# Patient Record
Sex: MALE | Race: WHITE | URBAN - METROPOLITAN AREA
[De-identification: names, ages, dates, MRNs, and addresses within clinical notes are randomized per-mention and may not be internally consistent; named-entity substitution may affect disease eponyms.]

---
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
## 2022-04-10 ENCOUNTER — HISTORICAL (OUTPATIENT)
Dept: ADMINISTRATIVE | Facility: HOSPITAL | Age: 22
End: 2022-04-10

## 2022-04-28 VITALS
SYSTOLIC BLOOD PRESSURE: 108 MMHG | WEIGHT: 150.13 LBS | DIASTOLIC BLOOD PRESSURE: 72 MMHG | HEIGHT: 70 IN | BODY MASS INDEX: 21.49 KG/M2

## 2022-05-03 NOTE — HISTORICAL [facility] CERNER
This is a historical note converted from Noris. Formatting and pictures may have been removed.  Please reference Noris for original formatting and attached multimedia. Chief Complaint  Concussion f/u  History of Present Illness  17 year old White Male JOHN PAUL presents to [facility] for follow up?9wks s/p concussion.? Today he is here with his father.? He will be returning to school for Orientation on 8/8/18.  Referral source: Roshan Huitron ATC at [facility]  Sport (current sport and additional athletic participation): soccer  DOI:?5/5/18?  Location: club soccer fields  DANNI (include protective equipment): Witnessed, consistent with patients memory. video reviewed in clinic--hit head on ground  Immediate symptoms: dizziness, headache, wobbly legs, light and noise sensitivity  Post-concussive course including symptoms, sleep, and academic performance: visual disturbance, headache, light and noise sensitivity, lightheadedness, fogginess, difficulty remembering, sleep disturbance, fatigue, drowsiness  Modifying factors:? some physical activity makes symptoms worse;mental activity makes symptoms worse  Previous treatment:?rest  ?   Physical and Vestibular Therapy with DARÍO Clarke.? Report sent by DARÍO is in medical chart.  ?   He is on PT since injury decreased now to 2x/wk.??He has progressed his VT from flat surface to now blue foam surface.? Feels like he is getting better slowly.? Tolerates elliptical, bike, and treadmill until 7mins during VT and would like to increase.? He does 7 mins of each modality.????He has been doing soccer passes?by kicking and throwing for 5mins at end of therapy.? He is doing some pushups and ab crunches at home and tolerating well.? He is also doing some chases/runs in his home pool that are of moderate intensity.? He has not been running on flat/dry surfaces.? He has finished his summer reading without any difficulty. He has not been scrimmaging.  ?   Sleep: 8-9hrs per night.? Eating very  well per mom/dad.? He has been watching the World Cup and tolerating it well.?  ?   Todays symptoms:? HA 3, balance problems 2, dizziness 1, lightheadedness 2, fatigue 2, sleeping more than usual 0, drowsiness 2, sensitivity to light/noise 2, feeling slowed down 2, feeling in a fog 2, difficulty concentrating 0.? Total score: 20  ?   No suicidal ideations or thoughts of harming self or others.  ?   Concussion History  Previous Concussions including date/recovery time: 2015 during soccer--symptoms x ~2days after hitting head on ground after a slide tackle  Previous Hospitalization for TBI:?none  ?  Pertinent Past Medical History  Personal history of migraines or headaches sinus headaches  No personal history of learning disability, dyslexia, or current 504 plan  No personal history of ADD/ADHD  No personal history of depression, anxiety, or other psychiatric conditions  ?  Current Medications  Allegra BID, Singulair, Dymista, Tylenol 650mg daily, Fish oil, Magnesium, Amitriptyline- taking 5mg po qHS,?Tumeric- no longer taking  ?  ?  Social and Academic History  Academic year: rising [employer]  School: [employer]  GPA: 3.8  Academic Accommodations: none  History of academic problems: none  Tobacco:Never used tobacco products  Drugs:Never used illicit  Alcohol:Never used alcohol  ?  Family History  No family history of migraines or headaches  No family history of depression, anxiety, or other psychiatric conditions  Review of Systems  Constitutional: no fever, no chills, no weight loss  CV: no swelling, no edema  RESP: no cough, no SOB.  : no urinary retention, no urinary incontinence  GI: no fecal incontinence  Skin: no rash, no wound  Neuro: no numbness/tingling, no weakness, no saddle anesthesia  MSK: as above  Psych: no depression, no anxiety  Heme/Lymph: no easy bruising, no easy bleeding, no lymphadenopathy  Immuno: no MRSA history  Physical Exam  Vitals & Measurements  HR:?80(Peripheral)?  BP:?106/71?  HT:?180?cm? HT:?180?cm? WT:?65.77?kg? WT:?65.77?kg? BMI:?20.3?  General: well developed;well nourished; cooperative; father at bedside; supportive  HEENT: NCAT; PERRL; EOMI with eye strain/with light sensitivity; convergence at?5inches repeated twice  SKIN: inspection and palpation of skin and soft tissue normal; no scars noted on upper/lower extremities  CV: vascular integrity noted; +2 symmetrical pulses, no edema  RESP: No respiratory distress  LYMPH: no LAD noted  PSYCH: alert and oriented with?appropriate mood and affect  NEURO: CN 2-12 grossly intact; no focal neurological deficits; DTRs +2/4 UE/LE bilateral and symmetrical; rapid alternating movements - intact but slow; pronator drift - intact; finger/nose -intact and slow  Spine: normal inspection; non-tender; FPFROM; normal strength;  MSK: normal gait and station but slow; no obvious deformity; non-tender UE/LE; 5/5 UE/LE bilateral and symmetrical  ?   Balance/Postural Stability:  Rhomberg: negative  ?   Vestibular Testing  VOMS:  Vertical: mild symptoms  Horizontal: mild symptoms  ?   Saccades:  Vertical: no Sx  Horizontal: no Sx  ?   Fakuda: within normal range  Assessment/Plan  1.?Concussion  - Symptom score has increased to 20 from 12 at previous visit  - I believe it is a good sign that his PT/VT is decreasing in frequency which means he is improving  - We will order some bloodwork on him to evaluate other sources of symptoms: CBC, CMP, YSH, Free T4, Vit D, Iron Profile, Ferritin  - We will start Topamax 50mg qHS; can stop Amitryptiline and only take if needed  - Continue Vestibular Therapy, and alternate with home exercises different days of therapy; start daily runs on flat/dry surfaces; no contact  - We will delineate fatigue vs headache, likely his fatigue is from being deconditioned  - Start monitoring HR at rest and exercise  - Continue daily walks/physical activity/pushups/crunches/Cont swimming regimen  - No soccer activities  yet--cont soccer passes/throws/shots on goal, no heading the ball or scrimmaging  - Continue supplements (Vit D 3, Fish oil, Magnesium); Tyl prn  - No need for academic accommodations since summer reading tolerated  - Can visit [email or web address] for home Vestibular Therapy  - Follow up in 2 weeks for repeat evaluation on 8/7/18, if 0 symptoms then we can evaluate with [facility] for RTP   Problem List/Past Medical History  Ongoing  Allergic rhinitis  Closed head injury  Concussion  History of closed head injury  Vestibular dysfunction  Visual disturbance  Historical  No qualifying data  Medications  amitriptyline 10 mg oral tablet, 10 mg= 1 tab(s), Oral, Once a day (at bedtime),? ?Not taking  amitriptyline 10 mg oral tablet, 10 mg= 1 tab(s), Oral, Once a day (at bedtime),? ?Not taking  [place] SOD 10 MG TABLET, 10 mg= 1 tab(s), Oral, Daily  Allergies  Suprax?(rash)  Social History  Tobacco  Never smoker Use:., 05/31/2018  Health Maintenance  Health Maintenance  ???Pending?(in the next year)  ???There are no current recommendations pending  ???Satisfied?(in the past 1 year)  ??? ??Satisfied?  ??? ? ? ?Body Mass Index Check on??07/26/18.??Satisfied by SYSTEM  ??? ? ? ?Depression Screening on??07/12/18.??Satisfied by Ana Reno MA  ?  ?     [1]?[facility] SM Fellow; Christiano Cowan MD 07/12/2018 12:12 CDT  [2]?[facility] Orth[facility] SM Fellow; Christiano Cowan MD 07/12/2018 12:12 CDT   We will start Topamax 50mg po qHS for headache symptoms.? (21 day supply)   Discussed with the fellow at time of visit.? HPI, PE, and Assessment and Plan reviewed.? Treatment plan is reasonable and appropriate.??Compliance with treatment plan is appropriate.??